# Patient Record
Sex: MALE | Race: OTHER | NOT HISPANIC OR LATINO | URBAN - METROPOLITAN AREA
[De-identification: names, ages, dates, MRNs, and addresses within clinical notes are randomized per-mention and may not be internally consistent; named-entity substitution may affect disease eponyms.]

---

## 2017-07-24 ENCOUNTER — EMERGENCY (EMERGENCY)
Facility: HOSPITAL | Age: 32
LOS: 1 days | Discharge: LEFT BEFORE DISCHARGE | End: 2017-07-24
Admitting: EMERGENCY MEDICINE

## 2017-07-24 VITALS
OXYGEN SATURATION: 100 % | TEMPERATURE: 98 F | DIASTOLIC BLOOD PRESSURE: 63 MMHG | SYSTOLIC BLOOD PRESSURE: 112 MMHG | RESPIRATION RATE: 16 BRPM | HEART RATE: 79 BPM

## 2017-12-04 ENCOUNTER — EMERGENCY (EMERGENCY)
Facility: HOSPITAL | Age: 32
LOS: 1 days | Discharge: HOME | End: 2017-12-04
Attending: EMERGENCY MEDICINE | Admitting: EMERGENCY MEDICINE
Payer: MEDICAID

## 2017-12-04 VITALS
OXYGEN SATURATION: 100 % | RESPIRATION RATE: 17 BRPM | TEMPERATURE: 98 F | SYSTOLIC BLOOD PRESSURE: 126 MMHG | DIASTOLIC BLOOD PRESSURE: 77 MMHG | HEART RATE: 91 BPM

## 2017-12-04 LAB
APPEARANCE UR: CLEAR — SIGNIFICANT CHANGE UP
BACTERIA # UR AUTO: SIGNIFICANT CHANGE UP
BILIRUB UR-MCNC: NEGATIVE — SIGNIFICANT CHANGE UP
BLOOD UR QL VISUAL: NEGATIVE — SIGNIFICANT CHANGE UP
COLOR SPEC: YELLOW — SIGNIFICANT CHANGE UP
GLUCOSE UR-MCNC: NEGATIVE — SIGNIFICANT CHANGE UP
KETONES UR-MCNC: NEGATIVE — SIGNIFICANT CHANGE UP
LEUKOCYTE ESTERASE UR-ACNC: NEGATIVE — SIGNIFICANT CHANGE UP
MUCOUS THREADS # UR AUTO: SIGNIFICANT CHANGE UP
NITRITE UR-MCNC: NEGATIVE — SIGNIFICANT CHANGE UP
PH UR: 7 — SIGNIFICANT CHANGE UP (ref 4.6–8)
PROT UR-MCNC: 20 — SIGNIFICANT CHANGE UP
RBC CASTS # UR COMP ASSIST: SIGNIFICANT CHANGE UP (ref 0–?)
SP GR SPEC: 1.03 — HIGH (ref 1–1.03)
UROBILINOGEN FLD QL: 1 E.U. — SIGNIFICANT CHANGE UP (ref 0.1–0.2)
WBC UR QL: SIGNIFICANT CHANGE UP (ref 0–?)

## 2017-12-04 PROCEDURE — 73130 X-RAY EXAM OF HAND: CPT | Mod: 26,RT

## 2017-12-04 PROCEDURE — 99284 EMERGENCY DEPT VISIT MOD MDM: CPT

## 2017-12-04 PROCEDURE — 73110 X-RAY EXAM OF WRIST: CPT | Mod: 26,RT

## 2017-12-04 RX ORDER — IBUPROFEN 200 MG
600 TABLET ORAL ONCE
Qty: 0 | Refills: 0 | Status: COMPLETED | OUTPATIENT
Start: 2017-12-04 | End: 2017-12-04

## 2017-12-04 RX ORDER — ACETAMINOPHEN 500 MG
650 TABLET ORAL ONCE
Qty: 0 | Refills: 0 | Status: COMPLETED | OUTPATIENT
Start: 2017-12-04 | End: 2017-12-04

## 2017-12-04 RX ADMIN — Medication 600 MILLIGRAM(S): at 14:32

## 2017-12-04 RX ADMIN — Medication 650 MILLIGRAM(S): at 14:32

## 2017-12-04 NOTE — ED PROVIDER NOTE - CARE PLAN
Principal Discharge DX:	Ganglion cyst of wrist, right  Secondary Diagnosis:	Abdominal pain Principal Discharge DX:	Ganglion cyst of wrist, right  Instructions for follow-up, activity and diet:	follow up with your pmd regarding your recent hospital stay.  if symptoms worsen return to the ed immediately.  if wrist pain persists may need mri.  if abdominal pain returns return to the ed may need additional imaging  Secondary Diagnosis:	Abdominal pain Principal Discharge DX:	Ganglion cyst of wrist, right  Instructions for follow-up, activity and diet:	follow up with your pmd regarding your recent hospital stay.  if symptoms worsen return to the ed immediately.  if wrist pain persists may need mri.  if abdominal pain returns return to the ed may need additional imaging.  patient returned to the ed  phone used #[phone].  patient x rays reviewed no fxs of dislocations may need mri if pain persists.  advised ice, rest, ace wrap.  ua results reviewed negative finding.  c/o of back pain and leg pain advised to take motrin 600mg every 6-8 hours no heavy lifting - pt sometimes lifts heavy objects at work.  ortho referral list provided.  ice or warm compresses to affected area.  if abdominal pain persists or nausea, vomiting or intolerance of food begins return to the ed may need ct scan.  Secondary Diagnosis:	Abdominal pain

## 2017-12-04 NOTE — ED PROVIDER NOTE - PLAN OF CARE
follow up with your pmd regarding your recent hospital stay.  if symptoms worsen return to the ed immediately.  if wrist pain persists may need mri.  if abdominal pain returns return to the ed may need additional imaging follow up with your pmd regarding your recent hospital stay.  if symptoms worsen return to the ed immediately.  if wrist pain persists may need mri.  if abdominal pain returns return to the ed may need additional imaging.  patient returned to the ed  phone used #[phone].  patient x rays reviewed no fxs of dislocations may need mri if pain persists.  advised ice, rest, ace wrap.  ua results reviewed negative finding.  c/o of back pain and leg pain advised to take motrin 600mg every 6-8 hours no heavy lifting - pt sometimes lifts heavy objects at work.  ortho referral list provided.  ice or warm compresses to affected area.  if abdominal pain persists or nausea, vomiting or intolerance of food begins return to the ed may need ct scan.

## 2017-12-04 NOTE — ED PROVIDER NOTE - OBJECTIVE STATEMENT
32 year old male c/o of right wrist pain x 2 months and right abdominal pain x 5 days. Patient denies fever, chills, vomiting, nausea, tingling/numbness, and urinary/fecal incontinence. Patient speaks Sinhala and has chosen to use a phone  (Sinhala  [phone]). Patient denies taking pain medication. 32 year old male c/o of right wrist pain x 2 months and right abdominal pain x 5 days. Patient denies fever, chills, vomiting, nausea, tingling/numbness, and urinary/fecal incontinence. Patient speaks Greenlandic and has chosen to use a phone  (Greenlandic  [phone]). Patient denies taking pain medication.  No fevers, dysuria.  Pain is lower abdomen. Has been eating and drinking without difficulty

## 2017-12-04 NOTE — ED PROVIDER NOTE - PHYSICAL EXAMINATION
Male chaperone: Alexandru Davenport, ED Technician Male chaperone: Alexandru Davenport, ED Technician  no CVAT, no massess palpable on abd, gu exam

## 2017-12-04 NOTE — ED ADULT TRIAGE NOTE - CHIEF COMPLAINT QUOTE
pt c/o right wrist pain, atraumatic x 3 months and right lower pelvic/groin pain x 15 days. denies urinary symptoms.

## 2017-12-04 NOTE — ED PROVIDER NOTE - PROGRESS NOTE DETAILS
deshawn grey - unable to locate patient.  left message at . deshawn grey - spoke to patient at  [phone].  patient will return to [facility] deshawn grey - patient returned to the ed  phone used #[phone].  patient x rays reviewed no fxs of dislocations may need mri if pain persists.  advised ice, rest, ace wrap.  ua results reviewed negative finding.  c/o of back pain and leg pain advised to take motrin 600mg every 6-8 hours no heavy lifting - pt sometimes lifts heavy objects at work.  ortho referral list provided.  ice or warm compresses to affected area.  if abdominal pain persists or nausea, vomiting or intolerance of food begins return to the ed may need ct scan.

## 2017-12-04 NOTE — ED PROVIDER NOTE - MEDICAL DECISION MAKING DETAILS
32 year old male c/o right wrist pain and lower belly pain. Benign abdominal exam. Check UA. Right wrist likely has ganglion cyst. Sent patient for Xray of right wrist. Outpatient follow up.

## 2021-02-02 ENCOUNTER — EMERGENCY (EMERGENCY)
Facility: HOSPITAL | Age: 36
LOS: 1 days | Discharge: HOME | End: 2021-02-02
Attending: EMERGENCY MEDICINE | Admitting: EMERGENCY MEDICINE
Payer: MEDICAID

## 2021-02-02 VITALS
SYSTOLIC BLOOD PRESSURE: 124 MMHG | HEART RATE: 80 BPM | RESPIRATION RATE: 16 BRPM | TEMPERATURE: 97 F | DIASTOLIC BLOOD PRESSURE: 82 MMHG | OXYGEN SATURATION: 98 %

## 2021-02-02 LAB
ALBUMIN SERPL ELPH-MCNC: 4.4 G/DL — SIGNIFICANT CHANGE UP (ref 3.3–5)
ALP SERPL-CCNC: 85 U/L — SIGNIFICANT CHANGE UP (ref 40–120)
ALT FLD-CCNC: 32 U/L — SIGNIFICANT CHANGE UP (ref 4–41)
ANION GAP SERPL CALC-SCNC: 10 MMOL/L — SIGNIFICANT CHANGE UP (ref 7–14)
AST SERPL-CCNC: 16 U/L — SIGNIFICANT CHANGE UP (ref 4–40)
BASOPHILS # BLD AUTO: 0.04 K/UL — SIGNIFICANT CHANGE UP (ref 0–0.2)
BASOPHILS NFR BLD AUTO: 0.3 % — SIGNIFICANT CHANGE UP (ref 0–2)
BILIRUB SERPL-MCNC: 0.4 MG/DL — SIGNIFICANT CHANGE UP (ref 0.2–1.2)
BUN SERPL-MCNC: 14 MG/DL — SIGNIFICANT CHANGE UP (ref 7–23)
CALCIUM SERPL-MCNC: 10.1 MG/DL — SIGNIFICANT CHANGE UP (ref 8.4–10.5)
CHLORIDE SERPL-SCNC: 102 MMOL/L — SIGNIFICANT CHANGE UP (ref 98–107)
CO2 SERPL-SCNC: 27 MMOL/L — SIGNIFICANT CHANGE UP (ref 22–31)
CREAT SERPL-MCNC: 0.74 MG/DL — SIGNIFICANT CHANGE UP (ref 0.5–1.3)
EOSINOPHIL # BLD AUTO: 0.17 K/UL — SIGNIFICANT CHANGE UP (ref 0–0.5)
EOSINOPHIL NFR BLD AUTO: 1.2 % — SIGNIFICANT CHANGE UP (ref 0–6)
GLUCOSE SERPL-MCNC: 116 MG/DL — HIGH (ref 70–99)
HCT VFR BLD CALC: 48.2 % — SIGNIFICANT CHANGE UP (ref 39–50)
HGB BLD-MCNC: 15.9 G/DL — SIGNIFICANT CHANGE UP (ref 13–17)
IANC: 10.36 K/UL — HIGH (ref 1.5–8.5)
IMM GRANULOCYTES NFR BLD AUTO: 0.8 % — SIGNIFICANT CHANGE UP (ref 0–1.5)
LIDOCAIN IGE QN: 34 U/L — SIGNIFICANT CHANGE UP (ref 7–60)
LYMPHOCYTES # BLD AUTO: 16.6 % — SIGNIFICANT CHANGE UP (ref 13–44)
LYMPHOCYTES # BLD AUTO: 2.32 K/UL — SIGNIFICANT CHANGE UP (ref 1–3.3)
MCHC RBC-ENTMCNC: 29.1 PG — SIGNIFICANT CHANGE UP (ref 27–34)
MCHC RBC-ENTMCNC: 33 GM/DL — SIGNIFICANT CHANGE UP (ref 32–36)
MCV RBC AUTO: 88.1 FL — SIGNIFICANT CHANGE UP (ref 80–100)
MONOCYTES # BLD AUTO: 1.01 K/UL — HIGH (ref 0–0.9)
MONOCYTES NFR BLD AUTO: 7.2 % — SIGNIFICANT CHANGE UP (ref 2–14)
NEUTROPHILS # BLD AUTO: 10.36 K/UL — HIGH (ref 1.8–7.4)
NEUTROPHILS NFR BLD AUTO: 73.9 % — SIGNIFICANT CHANGE UP (ref 43–77)
NRBC # BLD: 0 /100 WBCS — SIGNIFICANT CHANGE UP
NRBC # FLD: 0 K/UL — SIGNIFICANT CHANGE UP
PLATELET # BLD AUTO: 246 K/UL — SIGNIFICANT CHANGE UP (ref 150–400)
POTASSIUM SERPL-MCNC: 3.8 MMOL/L — SIGNIFICANT CHANGE UP (ref 3.5–5.3)
POTASSIUM SERPL-SCNC: 3.8 MMOL/L — SIGNIFICANT CHANGE UP (ref 3.5–5.3)
PROT SERPL-MCNC: 7.4 G/DL — SIGNIFICANT CHANGE UP (ref 6–8.3)
RBC # BLD: 5.47 M/UL — SIGNIFICANT CHANGE UP (ref 4.2–5.8)
RBC # FLD: 11.9 % — SIGNIFICANT CHANGE UP (ref 10.3–14.5)
SODIUM SERPL-SCNC: 139 MMOL/L — SIGNIFICANT CHANGE UP (ref 135–145)
WBC # BLD: 14.01 K/UL — HIGH (ref 3.8–10.5)
WBC # FLD AUTO: 14.01 K/UL — HIGH (ref 3.8–10.5)

## 2021-02-02 PROCEDURE — 99284 EMERGENCY DEPT VISIT MOD MDM: CPT | Mod: 25

## 2021-02-02 PROCEDURE — 93010 ELECTROCARDIOGRAM REPORT: CPT

## 2021-02-02 RX ORDER — FAMOTIDINE 10 MG/ML
1 INJECTION INTRAVENOUS
Qty: 10 | Refills: 0
Start: 2021-02-02 | End: 2021-02-06

## 2021-02-02 RX ORDER — OMEPRAZOLE 10 MG/1
1 CAPSULE, DELAYED RELEASE ORAL
Qty: 14 | Refills: 0
Start: 2021-02-02 | End: 2021-02-15

## 2021-02-02 RX ORDER — LIDOCAINE 4 G/100G
10 CREAM TOPICAL ONCE
Refills: 0 | Status: COMPLETED | OUTPATIENT
Start: 2021-02-02 | End: 2021-02-02

## 2021-02-02 RX ORDER — FAMOTIDINE 10 MG/ML
20 INJECTION INTRAVENOUS ONCE
Refills: 0 | Status: COMPLETED | OUTPATIENT
Start: 2021-02-02 | End: 2021-02-02

## 2021-02-02 RX ADMIN — FAMOTIDINE 20 MILLIGRAM(S): 10 INJECTION INTRAVENOUS at 02:16

## 2021-02-02 RX ADMIN — LIDOCAINE 10 MILLILITER(S): 4 CREAM TOPICAL at 02:16

## 2021-02-02 RX ADMIN — Medication 30 MILLILITER(S): at 02:16

## 2021-02-02 NOTE — ED PROVIDER NOTE - PHYSICAL EXAMINATION
Vijay ALLISON MD PGY3:   PHYSICAL EXAM:    GENERAL: NAD, comfortable.   HEENT:  Atraumatic, Normocephalic  CHEST/LUNG: Chest rise equal bilaterally. CTAB.   HEART: Regular rate and rhythm. No murmurs or rubs.   ABDOMEN: Epigastric TTP. Negative Arriaga's.   EXTREMITIES:  2+ Peripheral Pulses.  PSYCH: A&Ox3  SKIN: No obvious rashes or lesions

## 2021-02-02 NOTE — ED ADULT NURSE NOTE - OBJECTIVE STATEMENT
Pt arriving to room 22 A&OX4 ambulatory at baseline c/o epigastric pain x 2 days. Pt denies any significant PMHx. Pt states he was unable to sleep more than 1 hour, burning pain is worse at night. Pt endorsing nausea without vomiting. Pt denies taking any medication for the pain. RR even and unlabored. MD at bedside, will continue to monitor. Pt arriving to room 22 A&OX4 ambulatory at baseline c/o epigastric pain x 2 days. Pt denies any significant PMHx. Pt states he was unable to sleep more than 1 hour, burning pain is worse at night. Pt endorsing nausea without vomiting. Pt denies taking any medication for the pain. RR even and unlabored. Pt denies CP, SOB. MD at bedside, will continue to monitor. Pt arriving to room 22 A&OX4 ambulatory at baseline c/o epigastric pain x 2 days. Pt denies any significant PMHx. Pt states he was unable to sleep more than 1 hour, burning pain is worse at night. Pt endorsing nausea without vomiting. Pt denies taking any medication for the pain. RR even and unlabored. Pt denies CP, SOB. IV established with 20G in LAC. Medicated as per eMAR. MD at bedside, will continue to monitor.

## 2021-02-02 NOTE — ED PROVIDER NOTE - PATIENT PORTAL LINK FT
You can access the FollowMyHealth Patient Portal offered by [facility] by registering at the following website: [email or web address]. By joining [facility]’s FollowMyHealth portal, you will also be able to view your health information using other applications (apps) compatible with our system.

## 2021-02-02 NOTE — ED ADULT NURSE REASSESSMENT NOTE - NS ED NURSE REASSESS COMMENT FT1
Pt at baseline mental status. Pt verbalizing partial improvement in symptoms. Pt in NAD. RR even and unlabored. Pt discharged at this time.

## 2021-02-02 NOTE — ED PROVIDER NOTE - ATTENDING CONTRIBUTION TO CARE
37 yo with no sig PMH presenting with intermittent burning epigastric pain that radiates to the rest of his abdomen over the last 2 days.  Wakes him from sleep and is better after eating.  Some nausea and no vomiting.  Only noticeable at night    Gen: Well appearing in NAD  Head: NC/AT  Neck: trachea midline  Resp:  No distress  Abd: soft NT ND  Ext: no deformities  Neuro:  A&O appears non focal  Skin:  Warm and dry as visualized  Psych:  Normal affect and mood     37 yo no PMH with epigastric pain.  Differential includes pancreatitis (although not a drinker) as well as PUD/gastritis which are considered most likely based on timing and description.  Will treat symptomatically while awaiting labs to further evaluate for liver disease or pancreatitis.  Not consistent with an ACS based on description timing and lack of risk factors.

## 2021-02-02 NOTE — ED PROVIDER NOTE - AXIS
78 Ventricular Rate BPM  78 Atrial Rate BPM  196 QRS Duration ms  510 Q-T Interval ms  581 QTC Calculation(Bazett) ms  -62 R Axis degrees  112 T Axis degrees  Ventricular-paced rhythm with frequent AV dual-paced complexes  Biventricular pacemaker detected  Abnormal ECG  Confirmed by Harvey DOLAN, [facility] ([id number]) on 11/25/2019 8:18:01 PM  
Normal

## 2021-02-02 NOTE — ED ADULT TRIAGE NOTE - CHIEF COMPLAINT QUOTE
Pt arrives to ED via EMS from home c/o heartburn for two days.  LBM 5 hours ago. Pt c/o nausea but no vomiting.  Pt tried OTC medication with no relief.  Pt reports pain as "burning" to epigastric area.

## 2021-02-02 NOTE — ED PROVIDER NOTE - CLINICAL SUMMARY MEDICAL DECISION MAKING FREE TEXT BOX
Vijay ALLISON MD PGY3: 36 M no sig PMH here for worsening epigastric pain x 2 days c/f pancreatitis vs PUD vs gastritis. Less suspicious for cardiac etiology given location, lack of assoc sxs suggestive of cardiac disease and lack of hx. Will obtain lipase, basic labs and GI cocktail to assess for improvement. EKG nl.

## 2021-02-02 NOTE — ED ADULT NURSE NOTE - CHIEF COMPLAINT QUOTE
Pt arrives to ED via EMS from home c/o heartburn for two days.  LBM 5 hours ago. Pt c/o nausea but no vomiting.  Pt tried OTC medication with no relief.  Pt reports pain as "burning" to epigastric area.
no

## 2021-02-02 NOTE — ED PROVIDER NOTE - NSFOLLOWUPINSTRUCTIONS_ED_ALL_ED_FT
Please return to the ED for any concerns, including worsening abdominal pain, nausea, vomiting or any other concerns.     Please follow-up with a gastroenterologist as soon as possible.     Please take the pepcid and omeprazole that has been prescribed to you as directed.

## 2021-02-02 NOTE — ED PROVIDER NOTE - OBJECTIVE STATEMENT
Vijay ALLISON MD PGY3: 36 M no sig PMH here for burning epigastric pain assoc with nausea worsening x 2 days. Patient states for the second night in a row the pain woke him up from sleep and improved with PO intake. No emesis. No change in BM in terms of color or frequency. No fevers, chills. Did not take any medications for the pain. Pain does not occur during the daytime. No chest pain, diff breathing.

## 2021-02-02 NOTE — ED PROVIDER NOTE - IV ALTEPLASE DOOR HIDDEN
Current Outpatient Medications:         LEVOCETIRIZINE DIHYDROCHLORIDE 5 MG Oral Tab TAKE 1 TABLET BY MOUTH EVERY DAY AT NIGHT Disp: 30 tablet Rfl: 0              Current Outpatient Medications:  MONTELUKAST SODIUM 10 MG Oral Tab TAKE 1 TABLET BY MOUTH E
Left message for patient that RX requests have been denied due to need for follow up appointment. RN provided call back number for patient to schedule follow up appointment to assess how she is doing and if she would like to have further RX refills.
Refill request and singular denied.   Patient will require follow-up appointment for further refills as refill was granted in April 2019 advised to follow-up in 1 month
Xyzal and montelukast refilled on 4/19/2019. Pt granted a 30 day refill, and was informed she needed to schedule a follow up at that time. As of 5/6/2019 at 0945 there is no office visit scheduled.
show

## 2021-02-02 NOTE — ED PROVIDER NOTE - PROGRESS NOTE DETAILS
Vijay ALLISON MD PGY3: Patient reassessed. Symptomatically improved and repeat abdominal exam improved. WIll d/c home with return precautions and close GI follow-up.

## 2024-09-12 ENCOUNTER — APPOINTMENT (OUTPATIENT)
Dept: DERMATOLOGY | Facility: CLINIC | Age: 39
End: 2024-09-12

## 2024-12-06 NOTE — ED PROVIDER NOTE - CONSTITUTIONAL NEGATIVE STATEMENT, MLM
Name: VONDA SLAUGHTER  [facility] Emergency Room IP Care Transitions (Call 4 (23D PD))  Question 1   General Status   New Symptoms          Call Status:     Attempt 1 (edited by BOWEN on 12/06/2024 02:44 PM CST), Answered   Clinical Concerns - Issues List:     Other (Add Details in Comments)     Comments:     Pt went to ER yesterday and found to have a pilonidal cyst.  It broke open and drained.  They have instructions on what to do and are soaking it as prescribed.   (edited by BOWEN on 12/06/2024 02:47 PM CST)     Clinical Concerns - Actions Taken   Comments:     No needs at this time.  Doing much better.     no fever and no chills.

## 2025-06-18 ENCOUNTER — APPOINTMENT (OUTPATIENT)
Dept: DERMATOLOGY | Facility: CLINIC | Age: 40
End: 2025-06-18
Payer: MEDICAID

## 2025-06-18 VITALS — HEIGHT: 69 IN | WEIGHT: 191.8 LBS | BODY MASS INDEX: 28.41 KG/M2

## 2025-06-18 PROCEDURE — 99203 OFFICE O/P NEW LOW 30 MIN: CPT
